# Patient Record
Sex: FEMALE | Race: WHITE | NOT HISPANIC OR LATINO | Employment: UNEMPLOYED | URBAN - METROPOLITAN AREA
[De-identification: names, ages, dates, MRNs, and addresses within clinical notes are randomized per-mention and may not be internally consistent; named-entity substitution may affect disease eponyms.]

---

## 2021-06-25 ENCOUNTER — OFFICE VISIT (OUTPATIENT)
Dept: ORTHOPEDIC SURGERY | Facility: CLINIC | Age: 36
End: 2021-06-25

## 2021-06-25 VITALS
HEIGHT: 65 IN | WEIGHT: 269 LBS | BODY MASS INDEX: 44.82 KG/M2 | SYSTOLIC BLOOD PRESSURE: 123 MMHG | DIASTOLIC BLOOD PRESSURE: 78 MMHG | HEART RATE: 71 BPM

## 2021-06-25 DIAGNOSIS — M76.51 PATELLAR TENDINITIS OF RIGHT KNEE: Primary | ICD-10-CM

## 2021-06-25 DIAGNOSIS — E66.01 OBESITY, MORBID, BMI 40.0-49.9 (HCC): ICD-10-CM

## 2021-06-25 PROCEDURE — 99203 OFFICE O/P NEW LOW 30 MIN: CPT | Performed by: ORTHOPAEDIC SURGERY

## 2021-06-25 RX ORDER — GABAPENTIN 300 MG/1
CAPSULE ORAL
COMMUNITY
Start: 2021-06-12

## 2021-06-25 RX ORDER — ALBUTEROL SULFATE 90 UG/1
AEROSOL, METERED RESPIRATORY (INHALATION)
COMMUNITY
Start: 2021-06-12

## 2021-06-25 RX ORDER — IBUPROFEN 600 MG/1
600 TABLET ORAL 2 TIMES DAILY
Qty: 60 TABLET | Refills: 2 | Status: SHIPPED | OUTPATIENT
Start: 2021-06-25

## 2021-06-25 RX ORDER — AMLODIPINE BESYLATE 5 MG/1
TABLET ORAL
COMMUNITY
Start: 2021-06-12

## 2021-06-25 NOTE — PROGRESS NOTES
General Exam    Patient: Sindy Dupont    YOB: 1985    Medical Record Number: [id number]    Chief Complaints: Right knee pain    History of Present Illness:     35 y.o. female patient who presents for history of right knee pain.  Patient states she has had issues for years but worse recently especially after she moved when she had a lot increased activity.  Locates the pain mainly anteriorly just below her kneecap along her patellar tendon.  Difficulty flexing her knee due to the pain.  Worse with stairs.  Feels that her knee gets weak and gives out.  States she is tried some Voltaren gel in the past which helps some.  Some anti-inflammatories over-the-counter to help some.    Denies any numbness or tingling.  Denies any fevers, cough or shortness of breath.    Allergies:   Allergies   Allergen Reactions   • Codeine Rash   • Depakote [Divalproex Sodium] Rash   • Penicillins Rash       Home Medications:      Current Outpatient Medications:   •  albuterol sulfate  (90 Base) MCG/ACT inhaler, , Disp: , Rfl:   •  amLODIPine (NORVASC) 5 MG tablet, , Disp: , Rfl:   •  gabapentin (NEURONTIN) 300 MG capsule, , Disp: , Rfl:   •  ibuprofen (ADVIL,MOTRIN) 600 MG tablet, Take 1 tablet by mouth 2 (two) times a day., Disp: 60 tablet, Rfl: 2    No past medical history on file.    No past surgical history on file.    Social History     Occupational History   • Not on file   Tobacco Use   • Smoking status: Current Every Day Smoker     Packs/day: 0.50   • Smokeless tobacco: Never Used   Substance and Sexual Activity   • Alcohol use: Not on file   • Drug use: Not on file   • Sexual activity: Not on file      Social History     Social History Narrative   • Not on file       No family history on file.    Review of Systems:      Constitutional: Denies fever, shaking or chills   Eyes: Denies change in visual acuity   HEENT: Denies nasal congestion or sore throat   Respiratory: Denies cough or shortness of breath  "  Cardiovascular: Denies chest pain or edema  Endocrine: Denies tremors, palpitations, intolerance of heat or cold, polyuria, polydipsia.  GI: Denies abdominal pain, nausea, vomiting, bloody stools or diarrhea  : Denies frequency, urgency, incontinence, retention, or nocturia.  Musculoskeletal: Denies numbness, tingling or loss of motor function except as above  Integument: Denies rash, lesion or ulceration   Neurologic: Denies headache or focal weakness, deficits  Heme: Denies spontaneous or excessive bleeding, epistaxis, hematuria, melena, fatigue, enlarged or tender lymph nodes.      All other pertinent positives and negatives as noted above in HPI.    Physical Exam: 35 y.o. female    Vitals:    06/25/21 0821   BP: 123/78   Pulse: 71   Weight: 122 kg (269 lb)   Height: 165.1 cm (65\")       General:  Patient is awake and alert.  Appears in no acute distress or discomfort.    Psych:  Affect and demeanor are appropriate.    Eyes:  Conjunctiva and sclera appear grossly normal.  Eyes track well and EOM seem to be intact.    Ears:  No gross abnormalities.  Hearing adequate for the exam.    Cardiovascular:  Regular rate and rhythm.    Lungs:  Good chest expansion.  Breathing unlabored.    Lymph:  No palpable masses or adenopathy in the affected extremity    Musculoskeletal/Extremities:    Right lower extremity: Hip range of motion full painless.  Limited knee range of motion due to limited flexion as this causes pain along patellar tendon.  Positive tenderness palpation along Vigil.  No overt joint line pain.  Negative Zoë's.  Strength 4 out of 5 to muscle test.  Sensation tact is light touch.  Calf soft compressible.         Radiology:       AP pelvis and 4 views of the knee were taken reviewed to evaluate the patient's complaint/s.    AP pelvis demonstrates very mild degenerative changes bilateral hip joints with no acute fractures appreciated.    AP, lateral, PA notch and sunrise views of the right knee " demonstrate overall preservation of joint space with no acute fractures appreciated.  Soft tissues appear within normal limits.     No imaging for comparison.    Assessment: Right knee patellar tendinitis      Plan:      Recommend conservative treatment consisting of formal physical therapy.  Over-the-counter anti-inflammatories but patient requested prescription for ibuprofen which is fine she may take this next 2 to 3 weeks while doing physical therapy.  Also gave her some samples of Pennsaid which she likes we may place order for her is working well.  Encourage weight loss.  Also made referral for nutrition/dietitian due to obesity.  Current BMI 44.76.           We will plan for follow up as needed.    All questions were answered.  Patient understands and agrees with the plan.    Kelvin Coombs MD    06/25/2021    CC to Provider, No Known

## 2022-12-15 ENCOUNTER — APPOINTMENT (OUTPATIENT)
Dept: CT IMAGING | Facility: HOSPITAL | Age: 37
End: 2022-12-15

## 2022-12-15 ENCOUNTER — HOSPITAL ENCOUNTER (EMERGENCY)
Facility: HOSPITAL | Age: 37
Discharge: LEFT BEFORE DISCHARGE | End: 2022-12-15
Attending: EMERGENCY MEDICINE | Admitting: EMERGENCY MEDICINE

## 2022-12-15 ENCOUNTER — APPOINTMENT (OUTPATIENT)
Dept: GENERAL RADIOLOGY | Facility: HOSPITAL | Age: 37
End: 2022-12-15

## 2022-12-15 VITALS
TEMPERATURE: 97.7 F | HEART RATE: 83 BPM | BODY MASS INDEX: 48.82 KG/M2 | HEIGHT: 65 IN | WEIGHT: 293 LBS | DIASTOLIC BLOOD PRESSURE: 82 MMHG | OXYGEN SATURATION: 96 % | RESPIRATION RATE: 18 BRPM | SYSTOLIC BLOOD PRESSURE: 128 MMHG

## 2022-12-15 PROCEDURE — 99211 OFF/OP EST MAY X REQ PHY/QHP: CPT | Performed by: EMERGENCY MEDICINE
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
## 2022-12-20 ENCOUNTER — HOSPITAL ENCOUNTER (EMERGENCY)
Facility: HOSPITAL | Age: 37
Discharge: HOME | End: 2022-12-20
Attending: EMERGENCY MEDICINE | Admitting: EMERGENCY MEDICINE

## 2022-12-20 ENCOUNTER — APPOINTMENT (OUTPATIENT)
Dept: CT IMAGING | Facility: HOSPITAL | Age: 37
End: 2022-12-20

## 2022-12-20 VITALS
OXYGEN SATURATION: 98 % | TEMPERATURE: 97.7 F | HEIGHT: 65 IN | HEART RATE: 82 BPM | BODY MASS INDEX: 46.65 KG/M2 | WEIGHT: 280 LBS | SYSTOLIC BLOOD PRESSURE: 158 MMHG | RESPIRATION RATE: 18 BRPM | DIASTOLIC BLOOD PRESSURE: 90 MMHG
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
## 2022-12-20 DIAGNOSIS — S16.1XXA ACUTE STRAIN OF NECK MUSCLE, INITIAL ENCOUNTER: ICD-10-CM

## 2022-12-20 DIAGNOSIS — S09.90XA CLOSED HEAD INJURY, INITIAL ENCOUNTER: ICD-10-CM

## 2022-12-20 PROCEDURE — 71250 CT THORAX DX C-: CPT

## 2022-12-20 PROCEDURE — 72125 CT NECK SPINE W/O DYE: CPT

## 2022-12-20 PROCEDURE — 99282 EMERGENCY DEPT VISIT SF MDM: CPT

## 2022-12-20 PROCEDURE — 25010000002 ORPHENADRINE CITRATE PER 60 MG: Performed by: EMERGENCY MEDICINE

## 2022-12-20 PROCEDURE — 25010000002 KETOROLAC TROMETHAMINE PER 15 MG: Performed by: EMERGENCY MEDICINE

## 2022-12-20 PROCEDURE — 70450 CT HEAD/BRAIN W/O DYE: CPT

## 2022-12-20 PROCEDURE — 96372 THER/PROPH/DIAG INJ SC/IM: CPT

## 2022-12-20 RX ORDER — ORPHENADRINE CITRATE 30 MG/ML
60 INJECTION INTRAMUSCULAR; INTRAVENOUS 2 TIMES DAILY
Status: DISCONTINUED | OUTPATIENT
Start: 2022-12-20 | End: 2022-12-20 | Stop reason: HOSPADM

## 2022-12-20 RX ORDER — KETOROLAC TROMETHAMINE 30 MG/ML
30 INJECTION, SOLUTION INTRAMUSCULAR; INTRAVENOUS ONCE
Status: COMPLETED | OUTPATIENT
Start: 2022-12-20 | End: 2022-12-20

## 2022-12-20 RX ADMIN — ORPHENADRINE CITRATE 60 MG: 30 INJECTION INTRAMUSCULAR; INTRAVENOUS at 09:59

## 2022-12-20 RX ADMIN — KETOROLAC TROMETHAMINE 30 MG: 30 INJECTION, SOLUTION INTRAMUSCULAR; INTRAVENOUS at 10:00
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
## 2023-10-12 ENCOUNTER — TRANSCRIBE ORDERS (OUTPATIENT)
Dept: ADMINISTRATIVE | Facility: HOSPITAL | Age: 38
End: 2023-10-12
Payer: MEDICAID

## 2023-10-12 ENCOUNTER — HOSPITAL ENCOUNTER (OUTPATIENT)
Dept: CARDIOLOGY | Facility: HOSPITAL | Age: 38
Discharge: HOME | End: 2023-10-12
Payer: MEDICAID

## 2023-10-12 DIAGNOSIS — F19.90 SUBSTANCE USE DISORDER: ICD-10-CM

## 2023-10-12 DIAGNOSIS — F19.90 SUBSTANCE USE DISORDER: Primary | ICD-10-CM

## 2023-10-12 LAB
QT INTERVAL: 450 MS
QTC INTERVAL: 455 MS

## 2023-10-12 PROCEDURE — 93005 ELECTROCARDIOGRAM TRACING: CPT

## 2023-10-26 ENCOUNTER — TRANSCRIBE ORDERS (OUTPATIENT)
Dept: LAB | Facility: HOSPITAL | Age: 38
End: 2023-10-26
Payer: MEDICAID

## 2023-10-26 ENCOUNTER — LAB (OUTPATIENT)
Dept: LAB | Facility: HOSPITAL | Age: 38
End: 2023-10-26
Payer: MEDICAID

## 2023-10-26 DIAGNOSIS — Z00.01 ENCOUNTER FOR GENERAL ADULT MEDICAL EXAMINATION WITH ABNORMAL FINDINGS: ICD-10-CM

## 2023-10-26 DIAGNOSIS — F43.23 ADJUSTMENT DISORDER WITH MIXED ANXIETY AND DEPRESSED MOOD: ICD-10-CM

## 2023-10-26 DIAGNOSIS — R73.9 HYPERGLYCEMIA: Primary | ICD-10-CM

## 2023-10-26 DIAGNOSIS — I10 PRIMARY HYPERTENSION: ICD-10-CM

## 2023-10-26 DIAGNOSIS — R73.9 HYPERGLYCEMIA: ICD-10-CM

## 2023-10-26 DIAGNOSIS — J45.909 ACTIVE ASTHMA: ICD-10-CM

## 2023-10-26 DIAGNOSIS — G47.00 PERSISTENT DISORDER OF INITIATING OR MAINTAINING SLEEP: ICD-10-CM

## 2023-10-26 LAB
ALBUMIN SERPL-MCNC: 4.2 G/DL (ref 3.5–5.2)
ALBUMIN/GLOB SERPL: 1.4 G/DL
ALP SERPL-CCNC: 69 U/L (ref 39–117)
ALT SERPL W P-5'-P-CCNC: 25 U/L (ref 1–33)
ANION GAP SERPL CALCULATED.3IONS-SCNC: 9.2 MMOL/L (ref 5–15)
AST SERPL-CCNC: 21 U/L (ref 1–32)
BILIRUB SERPL-MCNC: 0.2 MG/DL (ref 0–1.2)
BUN SERPL-MCNC: 20 MG/DL (ref 6–20)
BUN/CREAT SERPL: 24.4 (ref 7–25)
CALCIUM SPEC-SCNC: 9.9 MG/DL (ref 8.6–10.5)
CHLORIDE SERPL-SCNC: 102 MMOL/L (ref 98–107)
CO2 SERPL-SCNC: 28.8 MMOL/L (ref 22–29)
CREAT SERPL-MCNC: 0.82 MG/DL (ref 0.57–1)
EGFRCR SERPLBLD CKD-EPI 2021: 95.2 ML/MIN/1.73
GLOBULIN UR ELPH-MCNC: 2.9 GM/DL
GLUCOSE SERPL-MCNC: 95 MG/DL (ref 65–99)
HBA1C MFR BLD: 5.5 % (ref 4.8–5.6)
POTASSIUM SERPL-SCNC: 4.1 MMOL/L (ref 3.5–5.2)
PROT SERPL-MCNC: 7.1 G/DL (ref 6–8.5)
SODIUM SERPL-SCNC: 140 MMOL/L (ref 136–145)

## 2023-10-26 PROCEDURE — 36415 COLL VENOUS BLD VENIPUNCTURE: CPT

## 2023-10-26 PROCEDURE — 80053 COMPREHEN METABOLIC PANEL: CPT

## 2023-10-26 PROCEDURE — 83036 HEMOGLOBIN GLYCOSYLATED A1C: CPT

## 2023-11-01 ENCOUNTER — HOSPITAL ENCOUNTER (EMERGENCY)
Facility: HOSPITAL | Age: 38
Discharge: LEFT BEFORE DISCHARGE | End: 2023-11-01
Attending: EMERGENCY MEDICINE | Admitting: EMERGENCY MEDICINE
Payer: MEDICAID

## 2023-11-01 VITALS
DIASTOLIC BLOOD PRESSURE: 71 MMHG | WEIGHT: 293 LBS | RESPIRATION RATE: 18 BRPM | HEART RATE: 69 BPM | HEIGHT: 65 IN | TEMPERATURE: 98.8 F | SYSTOLIC BLOOD PRESSURE: 119 MMHG | OXYGEN SATURATION: 99 % | BODY MASS INDEX: 48.82 KG/M2

## 2023-11-01 PROCEDURE — 99211 OFF/OP EST MAY X REQ PHY/QHP: CPT

## 2023-11-01 PROCEDURE — 99281 EMR DPT VST MAYX REQ PHY/QHP: CPT

## 2023-11-01 NOTE — ED PROVIDER NOTES
Subjective   History of Present Illness    Review of Systems    No past medical history on file.    Allergies   Allergen Reactions    Codeine Rash    Depakote [Divalproex Sodium] Rash    Penicillins Rash       No past surgical history on file.    No family history on file.    Social History     Socioeconomic History    Marital status: Single   Tobacco Use    Smoking status: Every Day     Packs/day: .5     Types: Cigarettes    Smokeless tobacco: Never           Objective   Physical Exam    Procedures           ED Course                                           MDM  Patient left without being seen describing frustration with being placed in a cote bed according to nursing staff  Final diagnoses:   None       ED Disposition  ED Disposition       None            No follow-up provider specified.       Medication List      No changes were made to your prescriptions during this visit.            Roman Zhang MD  11/01/23 2390       Roman Zhang MD  11/01/23 0908

## 2023-11-01 NOTE — Clinical Note
Pt was noted on the phone stating that she was in a bed in the hallway, the patient asked one of the ED techs how long it would be before she actually got treated, tech explained to patient that she had been placed in a cote bed and that a provider [place] be there shortly to evaluate her. Pt then talked on the phone and got up from the stretcher and left the ED.

## 2023-11-08 ENCOUNTER — HOSPITAL ENCOUNTER (EMERGENCY)
Facility: HOSPITAL | Age: 38
Discharge: LEFT BEFORE DISCHARGE | End: 2023-11-08
Attending: EMERGENCY MEDICINE
Payer: MEDICAID

## 2023-11-08 VITALS
RESPIRATION RATE: 20 BRPM | DIASTOLIC BLOOD PRESSURE: 81 MMHG | BODY MASS INDEX: 48.82 KG/M2 | OXYGEN SATURATION: 98 % | SYSTOLIC BLOOD PRESSURE: 107 MMHG | HEIGHT: 65 IN | HEART RATE: 75 BPM | TEMPERATURE: 98.5 F | WEIGHT: 293 LBS

## 2023-11-08 PROCEDURE — 93005 ELECTROCARDIOGRAM TRACING: CPT | Performed by: EMERGENCY MEDICINE

## 2023-11-08 PROCEDURE — 99211 OFF/OP EST MAY X REQ PHY/QHP: CPT | Performed by: EMERGENCY MEDICINE

## 2023-11-08 PROCEDURE — 93005 ELECTROCARDIOGRAM TRACING: CPT

## 2023-11-09 LAB
QT INTERVAL: 459 MS
QTC INTERVAL: 484 MS

## 2024-01-02 ENCOUNTER — TELEPHONE (OUTPATIENT)
Dept: BARIATRICS/WEIGHT MGMT | Facility: CLINIC | Age: 39
End: 2024-01-02
Payer: MEDICAID

## 2024-01-02 NOTE — TELEPHONE ENCOUNTER
Called pt to review new patient packet & bariatric benefits. LMOVM to return call to the office.

## 2024-01-23 ENCOUNTER — TELEPHONE (OUTPATIENT)
Dept: BARIATRICS/WEIGHT MGMT | Facility: CLINIC | Age: 39
End: 2024-01-23
Payer: MEDICAID

## 2024-08-12 ENCOUNTER — TELEPHONE (OUTPATIENT)
Dept: BARIATRICS/WEIGHT MGMT | Facility: CLINIC | Age: 39
End: 2024-08-12
Payer: MEDICAID

## 2024-08-12 NOTE — TELEPHONE ENCOUNTER
Pt called and wanted to reschedule intake appt. She was scheduled for Jan 2024 and was a no show    Called Sindy Dupont    to discuss bariatric new patient packet   6 Months of SWL require by insurance       Current Health Plan(s)   No Cert of Coverage Obtained    VIA      Primary/ ID #  [facility]  [id number]             Secondary/ ID #             Exclusion on Policy     Exclusion   No Pt wanting selfpay No     Deductible Individual                  Amount Met  0                                                  0 OOP Max Individual           Amount Met  0                                           0     Deductible Family                       Amount Met  0                                                 0 OOP Max Family               Amount Met  0                                         0     Bariatric Lifetime Max 0      Facility or Accreditation required? no    Insurance Coverage % 100       Called Patient pt called office 335pm 8.12.24    Intake Scheduled for 9.3.24       Arrive at 745AM, bring drink, snack, and jacket, classroom is cold.  All pt will have class, see KEENA LOMAS and [facility]      Cert of Coverage attached No         [facility] about 2 weeks prior to intake.   Paperwork needs to be completed before intake appt, or may cause delay in eval. Yes

## 2024-08-30 ENCOUNTER — TELEPHONE (OUTPATIENT)
Dept: BARIATRICS/WEIGHT MGMT | Facility: CLINIC | Age: 39
End: 2024-08-30
Payer: MEDICAID

## 2024-08-30 NOTE — TELEPHONE ENCOUNTER
Pt called to see if she could use her mental health provider for her psych eval. Pt stated she does have the money to pay for the visit with [facility]. Tried to explain options to pt and call was disconnected.Will be cx intake appts at this time.

## 2025-04-16 ENCOUNTER — TELEPHONE (OUTPATIENT)
Dept: BARIATRICS/WEIGHT MGMT | Facility: CLINIC | Age: 40
End: 2025-04-16
Payer: MEDICAID

## 2025-04-16 NOTE — TELEPHONE ENCOUNTER
Pt called to confirm everything was received as far as cert of benefits for her sx new pt packet/please advise

## 2025-05-05 ENCOUNTER — TELEPHONE (OUTPATIENT)
Dept: BARIATRICS/WEIGHT MGMT | Facility: CLINIC | Age: 40
End: 2025-05-05
Payer: MEDICARE

## 2025-05-06 ENCOUNTER — TELEPHONE (OUTPATIENT)
Dept: BARIATRICS/WEIGHT MGMT | Facility: CLINIC | Age: 40
End: 2025-05-06
Payer: MEDICARE

## 2025-05-06 NOTE — TELEPHONE ENCOUNTER
Called Sindy Dupont    to discuss bariatric new patient packet   0 Months of SWL require by insurance       Current Health Plan(s)   Yes Cert of Coverage Obtained    VIA  PT    Primary/ ID #  [employer]  [id number]            Secondary/ ID #             Exclusion on Policy     Exclusion   No Pt wanting selfpay No     Deductible Individual                  Amount Met  0 OOP Max Individual           Amount Met  9350                                   261.22     Deductible Family                       Amount Met   OOP Max Family               Amount Met       Bariatric Lifetime Max      Facility or Accreditation required?    Insurance Coverage % 80/20       Called Patient 5.6.25    Intake Scheduled for 5.20.25       Arrive at 745AM, bring drink, snack, and jacket, classroom is cold.  All pt will have class, see KEENA LOMAS and [facility]      Cert of Coverage attached Yes     Does pt have a None  if yes Who   Need to be at appts? no    Told pt to expect call from [place], area code 859 about 2 weeks prior to intake.   Paperwork needs to be completed before intake appt, or may cause delay in eval. Yes    Additional Notes:

## 2025-05-13 RX ORDER — BISOPROLOL FUMARATE 10 MG/1
1 TABLET, FILM COATED ORAL DAILY
COMMUNITY
Start: 2025-03-21

## 2025-05-20 ENCOUNTER — OFFICE VISIT (OUTPATIENT)
Dept: BARIATRICS/WEIGHT MGMT | Facility: CLINIC | Age: 40
End: 2025-05-20
Payer: MEDICARE

## 2025-05-20 ENCOUNTER — PREP FOR SURGERY (OUTPATIENT)
Dept: OTHER | Facility: HOSPITAL | Age: 40
End: 2025-05-20
Payer: MEDICARE

## 2025-05-20 ENCOUNTER — PATIENT ROUNDING (BHMG ONLY) (OUTPATIENT)
Dept: BARIATRICS/WEIGHT MGMT | Facility: CLINIC | Age: 40
End: 2025-05-20
Payer: MEDICARE

## 2025-05-20 VITALS
HEART RATE: 67 BPM | SYSTOLIC BLOOD PRESSURE: 126 MMHG | HEIGHT: 65 IN | OXYGEN SATURATION: 98 % | BODY MASS INDEX: 44.18 KG/M2 | DIASTOLIC BLOOD PRESSURE: 69 MMHG | WEIGHT: 265.2 LBS

## 2025-05-20 DIAGNOSIS — M79.10 MYALGIA, UNSPECIFIED SITE: ICD-10-CM

## 2025-05-20 DIAGNOSIS — Z01.818 PRE-OP EXAM: ICD-10-CM

## 2025-05-20 DIAGNOSIS — R79.9 ABNORMAL FINDING OF BLOOD CHEMISTRY, UNSPECIFIED: ICD-10-CM

## 2025-05-20 DIAGNOSIS — E55.9 VITAMIN D DEFICIENCY, UNSPECIFIED: ICD-10-CM

## 2025-05-20 DIAGNOSIS — Z01.818 PRE-OPERATIVE CLEARANCE: ICD-10-CM

## 2025-05-20 DIAGNOSIS — E66.01 OBESITY, CLASS III, BMI 40-49.9 (MORBID OBESITY): Primary | ICD-10-CM

## 2025-05-20 DIAGNOSIS — R63.5 ABNORMAL WEIGHT GAIN: ICD-10-CM

## 2025-05-20 PROBLEM — E66.813 OBESITY, CLASS III, BMI 40-49.9 (MORBID OBESITY): Status: ACTIVE | Noted: 2025-05-20

## 2025-05-20 RX ORDER — SODIUM CHLORIDE 9 MG/ML
30 INJECTION, SOLUTION INTRAVENOUS CONTINUOUS PRN
OUTPATIENT
Start: 2025-05-20 | End: 2025-05-20

## 2025-05-20 RX ORDER — NICOTINE 21-14-7MG
KIT TRANSDERMAL
Qty: 70 EACH | Refills: 0 | Status: SHIPPED | OUTPATIENT
Start: 2025-05-20 | End: 2025-07-29

## 2025-05-20 RX ORDER — SODIUM CHLORIDE 0.9 % (FLUSH) 0.9 %
10 SYRINGE (ML) INJECTION AS NEEDED
OUTPATIENT
Start: 2025-05-20

## 2025-05-20 NOTE — PROGRESS NOTES
"Nutrition Services    Patient Name: Sindy Dupont  YOB: 1985  MRN: [id number]  Date of Service: 25      ICD-10-CM ICD-9-CM   1. Obesity, Class III, BMI 40-49.9 (morbid obesity)  E66.01 278.01   2. Abnormal weight gain  R63.5 783.1   3. Abnormal finding of blood chemistry, unspecified  R79.9 790.6   4. Vitamin D deficiency, unspecified  E55.9 268.9   5. Myalgia, unspecified site  M79.10 729.1   6. Pre-operative clearance  Z01.818 V72.84   7. Pre-op exam  Z01.818 V72.84        RD Recommendation        Candidacy for surgery? Good   RD Comments Recommend patient for surgery   Procedure Patient is pursuing VSG     NUTRITION ASSESSMENT - BARIATRIC SURGERY      Reason for Visit RDN eval for surgery, SWL 1/0     H&P      Past Medical History:   Diagnosis Date    Anxiety     Asthma     Depression     Diabetes mellitus     HLD (hyperlipidemia)     HTN (hypertension)     MS (multiple sclerosis)     RLS (restless legs syndrome)     Seizures     Substance use disorder     methadone program       Past Surgical History:   Procedure Laterality Date     SECTION      x4        Previous Goals          Encounter Information        Visit Narrative     Patient was on semaglutide injection but is going to stop taking. Patient reports she already eats slowly. Patient states she does not drink and eat together. Patient has not tried protein shakes.     Diet Recall:   Breakfast: eggs with potatoes and toast/cream of wheat  Lunch: skips usually, sandwiches occasionally  Dinner: meal preps for week - chicken stir lake with veggies  Snacks: fruit, chips  Beverages: iced coffee, lemonade    Exercise: patient exercises at home for 15-20 minutes each day    Supplements: MV, hair skin and nails    Self Monitoring:          Anthropometrics        Current Height, Weight Height: 165.1 cm (65\")  Weight: 120 kg (265 lb 3.2 oz) (25)       Trending Weight Hx      Wt Readings from Last 30 Encounters:   25 " 120 kg (265 lb 3.2 oz)   11/01/23 1806 (!) 137 kg (302 lb 0.5 oz)   12/20/22 0937 127 kg (280 lb)   06/25/21 0821 122 kg (269 lb)      BMI kg/m2 Body mass index is 44.13 kg/m².       Nutrition Diagnosis         Nutrition Dx Statement Overweight/obesity RT multifactorial biochemical, behavioral and environmental contributors to disease AEB BMI 44.13 kg/m^2         Nutrition Intervention         Nutrition Intervention Nutrition education related to diet modification and physical activity        Monitor/Evaluation        New Goals Patient to increase water intake and switch to SF drinks  Patient to try protein shakes  Patient to add protein with snacks       Total time spent with pt 15 minutes of which 15 minutes were spent on education.       Electronically signed by:  Leyt Kramer RD  05/20/25 13:33 EDT

## 2025-05-20 NOTE — PATIENT INSTRUCTIONS
Goals:  Patient to increase water intake and switch to SF drinks  Patient to try protein shakes  Patient to add protein with snacks

## 2025-05-20 NOTE — PROGRESS NOTES
"Sindy Dupont is a 39 y.o. female with morbid obesity with co-morbidities including COMORBIDITIES: HIGH BLOOD PRESSURE, HIGH CHOLESTEROL, and DIABETES    [facility] BARIATRIC SURGERY   [address] [place] [address]   [address] [place] [address]  Dept: [phone]  2025      Sindy Dupont.  [id number]  [id number]  1985  female      Chief Complaint of weight gain; unable to maintain weight loss    History of Present Illness:   Sindy is a 39 y.o. female who presents today for evaluation, education and consultation regarding weight loss surgery. The patient is interested in the sleeve gastrectomy.      Diet History:See dietician/RN/MA documentation for complete history of weight and diet.     Bariatric Surgery Evaluation: The patient is being seen for an initial visit for bariatric surgery evaluation.      Past weight loss attempts: low fat diet, calorie counting, atkins, low carb diet, sugar busters, kristen gonsales, Prozac, Wellbutrin,     STOP-Bang Score  Have you been diagnosed with Sleep Apnea?: no  Snoring?: no  Tired?: yes  Observed?: no  Pressure?: yes  Stop Score: 2  Body Mass Index more than 35 kg/m2?: yes  Age older than 50 year old?: no  Neck large? \">17\"/43cm-M, >16\"/41cm-F: no  Gender=Male?: no  Total Stop-Bang Score: 3       Past Medical History:   Diagnosis Date    Anxiety     Asthma     Depression     Diabetes mellitus     HLD (hyperlipidemia)     HTN (hypertension)     MS (multiple sclerosis)     RLS (restless legs syndrome)     Seizures     Substance use disorder     methadone program   Seizure- stress or medication induced   Scoliosis - no MS hx  Pre diabetes     Past Surgical History:   Procedure Laterality Date     SECTION      x4   Carpal tunnel syndrome right wrist     Allergies   Allergen Reactions    Tramadol Unknown - High Severity     Makes have seizures    Codeine Rash    Depakote [Divalproex Sodium] " Rash    Penicillins Rash   Tegretol  Tofranil      Current Outpatient Medications:     albuterol sulfate  (90 Base) MCG/ACT inhaler, , Disp: , Rfl:     amLODIPine (NORVASC) 5 MG tablet, , Disp: , Rfl:     bisoprolol (ZEBeta) 10 MG tablet, Take 1 tablet by mouth Daily., Disp: , Rfl:     gabapentin (NEURONTIN) 300 MG capsule, , Disp: , Rfl:     ibuprofen (ADVIL,MOTRIN) 600 MG tablet, Take 1 tablet by mouth 2 (two) times a day., Disp: 60 tablet, Rfl: 2    METHADONE HCL PO, Take  by mouth., Disp: , Rfl:     NON FORMULARY, Inject 1.5 mg under the skin into the appropriate area as directed 1 (One) Time Per Week. Semaglutide compound weekly, Disp: , Rfl:     SEMAGLUTIDE, 1 MG/DOSE, SC, Inject 1 mg under the skin into the appropriate area as directed 1 (One) Time Per Week., Disp: , Rfl:     Nicotine 21-14-7 MG/24HR kit, Place 21 mg on the skin as directed by provider Daily for 42 days, THEN 14 mg Daily for 14 days, THEN 7 mg Daily for 14 days., Disp: 70 each, Rfl: 0    Social History     Socioeconomic History    Marital status: Single   Tobacco Use    Smoking status: Every Day     Current packs/day: 0.50     Average packs/day: 0.5 packs/day for 15.4 years (7.7 ttl pk-yrs)     Types: Cigarettes     Start date: 2010     Passive exposure: Current    Smokeless tobacco: Never   Substance and Sexual Activity    Alcohol use: Not Currently    Drug use: Not Currently    Sexual activity: Defer       History reviewed. No pertinent family history.      Review of Systems:  Review of Systems   Constitutional:         Weight gain, fatigue, hair loss    HENT:          Dentures, nose bleeds, allergies    Respiratory:          Asthma    Cardiovascular:         HTN, shortness of breath on exertion, cramping in legs when walking, ankle swelling    Gastrointestinal:  Positive for constipation.   Endocrine:        DMII    Musculoskeletal:         Foot, neck, knee, heel pain, plantar fasciitis, sciatica, carpal tunnel syndrome,    Skin:  Negative.    Neurological:  Positive for seizures, weakness and numbness.        RLS,    Hematological: Negative.    Psychiatric/Behavioral:          Depression and anxiety, been in a chemical dependency group for 5 years        Physical Exam:  Vital Signs:  Weight: 120 kg (265 lb 3.2 oz)   Body mass index is 44.13 kg/m².  Temp: (not recorded)   Heart Rate: 67   BP: 126/69     Physical Exam  Constitutional:       Appearance: Normal appearance. She is obese.   Cardiovascular:      Rate and Rhythm: Normal rate.   Pulmonary:      Effort: Pulmonary effort is normal.      Breath sounds: Normal breath sounds.   Abdominal:      General: Abdomen is flat.      Palpations: Abdomen is soft.   Skin:     General: Skin is warm and dry.   Neurological:      General: No focal deficit present.      Mental Status: She is alert and oriented to person, place, and time.   Psychiatric:         Mood and Affect: Mood normal.         Behavior: Behavior normal.         Thought Content: Thought content normal.         Judgment: Judgment normal.            Assessment:         Sindy Dupont is a 39 y.o. year old female with medically complicated obesity class 3. Weight: 120 kg (265 lb 3.2 oz), Body mass index is 44.13 kg/m². and weight related problems.    I explained in detail the procedures that we are performing.  All of those procedures can be performed laparoscopically but there is a chance to convert to open if any technical challenges or complications do occur.  Bariatric surgery is not cosmetic surgery but rather a tool to help a patient make a life-long commitment lifestyle changes including diet, exercise, behavior changes, and taking supplemental vitamins and minerals.    Due to the patient's BMI and co-morbidities they are at a high risk for surgery and will obtain the following:   A psychological evaluation will be arranged for this patient. CBC, CMP,  TSH and HgbA1C will be drawn.     Sindy Dupont will be set up for a pre-operative  diagnostic esophagogastroduodenoscopy with biopsy for evaluation. The risks and benefits of the procedure were discussed with the patient in detail and all questions were answered.  Possibility of perforation, bleeding, aspiration, anoxic brain injury, respiratory and/or cardiac arrest and death were discussed.   She received handouts regarding, all questions were answered and informed consent was obtained.     The risks, benefits, alternatives, and potential complications of all of the procedures were explained in detail including, but not limited to death, anesthesia and medication adverse effect/DVT, pulmonary embolism, trocar site/incisional hernia, wound infection, abdominal infection, bleeding, failure to lose weight or gain weight and change in body image, metabolic complications with calcium, thiamine, vitamin B12, folate, iron, and anemia.    The patient was advised to start a high protein, low fat and low carbohydrate diet. The patient was given individualized information  along with general group information and handouts.     The patient was encouraged to start routine exercise including but not limited to 150 minutes per week.     The consultation plan was reviewed with the patient.    The patient understands the surgical procedures and the different surgical options that are available.  She understands the lifestyle changes that would be required after surgery and has agreed to participate in a pre-operative and postoperative weight management program.  She also expressed understanding of possible risks, had several questions answered and desires to proceed.    I think she is a good candidate for this surgery, and is interested in a sleeve gastrectomy ( this is pending psychiatric clearance with her hx of substance abuse and methadone use). .    Encounter Diagnoses   Name Primary?    Obesity, Class III, BMI 40-49.9 (morbid obesity) Yes    Abnormal weight gain     Abnormal finding of blood chemistry,  unspecified     Vitamin D deficiency, unspecified     Myalgia, unspecified site        Plan:    Patient will have evaluations and follow up with bariatric dieticians and a psychologist before undergoing a multidisciplinary review of her candidacy.  We also discussed the weight loss requirement and rationale, and other program requirements.    PT will need an EGD and cardiac clearance prior to bariatric surgery. Plan to follow up in 1 month for Swl. Also need to quit smoking a minimum of 30 days before bariatric surgery. I did prescribe nicotine replacement patches for the pt today.     Total time spent with pt 45 minutes of which 30 minutes were spent on education.     Shey Bullock, APRN  5/20/2025

## 2025-05-27 ENCOUNTER — TELEPHONE (OUTPATIENT)
Dept: BARIATRICS/WEIGHT MGMT | Facility: CLINIC | Age: 40
End: 2025-05-27
Payer: MEDICARE

## 2025-05-27 NOTE — TELEPHONE ENCOUNTER
Pt requested return call due to nicotine test and getting script for nicotine patch. Please advise/pt stated she will need to cancel sx if she cannot stop smoking

## 2025-05-27 NOTE — TELEPHONE ENCOUNTER
PA submitted for Nicotine kit  Key: [id number]  Denied.    We denied coverage for this drug because Medicare law does not include over-the-counter drugs   (nonprescription drugs) under Medicare Part D coverage. This is an over-the-counter drug. This   is not a medical necessity decision. It is a benefit issue only.

## 2025-05-27 NOTE — TELEPHONE ENCOUNTER
"Pt called regarding the nicotine kit Shey sent in, she has not been able to  and start it, as the \"pharmacy said it needs us to do something\" Pt is requesting if we could send in something else her insurance will pay for. Pt would also like to push back her EGD scheduled for 6.2.25 as she does not want to waste anyones time if she can not quit smoking.   "

## 2025-05-28 NOTE — TELEPHONE ENCOUNTER
Informed pt that the nicotine kit was denied and that pt will need to reach out to insurance and see what smoking cessation options are covered.  Voiced understanding.

## 2025-05-28 NOTE — TELEPHONE ENCOUNTER
Called and spoke with patient, rescheduled EGD to 6.5.2025, updated instructions sent via my chart and gave verbally

## 2025-06-05 ENCOUNTER — TELEPHONE (OUTPATIENT)
Dept: BARIATRICS/WEIGHT MGMT | Facility: CLINIC | Age: 40
End: 2025-06-05
Payer: MEDICARE

## 2025-06-05 NOTE — TELEPHONE ENCOUNTER
Patient called, states scheduled for EGD today but took weight loss on Tuesday. RS EGD due to has to be off of shot x7 days. Sent updated instructions via my chart.

## 2025-06-16 ENCOUNTER — TELEPHONE (OUTPATIENT)
Dept: CARDIOLOGY | Facility: CLINIC | Age: 40
End: 2025-06-16
Payer: MEDICARE

## 2025-06-16 NOTE — TELEPHONE ENCOUNTER
FACILITY: [facility]  DR: Bryce  PHONE: [phone]  FAX: [phone]  PROCEDURE: bariatric surgery  SCHEDULED:   MEDS TO HOLD:

## 2025-06-18 ENCOUNTER — TELEPHONE (OUTPATIENT)
Dept: BARIATRICS/WEIGHT MGMT | Facility: CLINIC | Age: 40
End: 2025-06-18
Payer: MEDICARE

## 2025-06-18 NOTE — TELEPHONE ENCOUNTER
Patient called, [facility] stating unable to keep surgery scheduled for today due to no transportation. Called patient back, EGD is scheduled for tomorrow, Thursday June 19. Per patient, need to RS still due to transportation not answering the phone. RS to 7.7.2025.

## 2025-07-08 ENCOUNTER — TELEPHONE (OUTPATIENT)
Dept: BARIATRICS/WEIGHT MGMT | Facility: CLINIC | Age: 40
End: 2025-07-08
Payer: MEDICARE

## 2025-07-08 NOTE — TELEPHONE ENCOUNTER
Called patient at [phone] [facility]. Patient was scheduled for EGD yesterday, 7.7.2025 but was a no show to procedure.

## 2025-07-22 ENCOUNTER — TELEPHONE (OUTPATIENT)
Dept: BARIATRICS/WEIGHT MGMT | Facility: CLINIC | Age: 40
End: 2025-07-22
Payer: MEDICARE

## 2025-07-22 NOTE — TELEPHONE ENCOUNTER
Called and spoke with patient. RS EGD due to patient was no show to previous one. RS appointment with Dr. Wu due to EGD is required prior to consult. Patient is aware needs to complete labs, cardiac clearance and PCP clearance as well prior to moving forward with PA process for surgery.

## 2025-08-11 ENCOUNTER — TELEPHONE (OUTPATIENT)
Dept: BARIATRICS/WEIGHT MGMT | Facility: CLINIC | Age: 40
End: 2025-08-11
Payer: MEDICARE

## 2025-08-14 ENCOUNTER — TELEPHONE (OUTPATIENT)
Dept: BARIATRICS/WEIGHT MGMT | Facility: CLINIC | Age: 40
End: 2025-08-14
Payer: MEDICARE